# Patient Record
Sex: MALE | Race: WHITE | NOT HISPANIC OR LATINO
[De-identification: names, ages, dates, MRNs, and addresses within clinical notes are randomized per-mention and may not be internally consistent; named-entity substitution may affect disease eponyms.]

---

## 2024-09-26 ENCOUNTER — APPOINTMENT (OUTPATIENT)
Dept: ORTHOPEDIC SURGERY | Facility: CLINIC | Age: 34
End: 2024-09-26
Payer: OTHER MISCELLANEOUS

## 2024-09-26 VITALS — BODY MASS INDEX: 29.8 KG/M2 | WEIGHT: 220 LBS | HEIGHT: 72 IN

## 2024-09-26 DIAGNOSIS — M54.16 RADICULOPATHY, LUMBAR REGION: ICD-10-CM

## 2024-09-26 DIAGNOSIS — Z78.9 OTHER SPECIFIED HEALTH STATUS: ICD-10-CM

## 2024-09-26 PROCEDURE — 99204 OFFICE O/P NEW MOD 45 MIN: CPT

## 2024-09-27 NOTE — HISTORY OF PRESENT ILLNESS
[de-identified] : The patient is a 33 year  old R hand dominant male who presents today complaining of L knee pain.   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 This is NOT an athletic injury occurring associated with an interscholastic or organized sports team. Quality of symptoms: sharp, stabbing lateral pain radiates into shin, dull aching, tight anterior knee pain, clicking, catching with deep knee flexion Improves with: rest, compression sleeve Worse with: prolonged walking, up stairs, squats Prior treatment: [facility] [place] at [facility], [facility]ARE[facility] doc Prior Imaging: Xray at ED, MRI at P Out of work/sport: currently out of work since 8/26/24 School/Sport/Position/Occupation: [employer] Additional Information: None

## 2024-09-27 NOTE — IMAGING
[de-identified] : The patient is a well appearing 33 year old male of their stated age. Patient ambulates with a normal gait. POSITIVE straight leg raise LEFT SIDED   Effected Knee: LEFT ROM:  0-145 degrees Lachman: Negative Pivot Shift: Negative Anterior Drawer: Negative Posterior Drawer / Sag: Negative Varus Stress 0 degrees: Stable Varus Stress 30 degrees: Stable Valgus Stress 0 degrees: Stable Valgus Stress 30 degrees: Stable Medial Tad: Negative Lateral Tad: Negative Patella Glide: 2+ Patella Apprehension: Negative Patella Grind: Negative   Palpation: Medial Joint Line: TENDER  Lateral Joint Line: Nontender Medial Collateral Ligament: Nontender Lateral Collateral Ligament/PLC: Nontender Distal Femur: Nontender Proximal Tibia: Nontender Tibial Tubercle: Nontender Distal Pole Patella: Nontender Quadriceps Tendon: Nontender &  Intact Patella Tendon: Nontender &  Intact Medial Femoral Condyle: Nontender Medial Distal Hamstring/PES: Nontender Lateral Distal Hamstring: Nontender & Stable Iliotibial Band: Nontender Medial Patellofemoral Ligament: Nontender Adductor: Nontender Proximal GSC-Plantaris: Nontender Calf: Supple & Nontender   Inspection: Deformity: No Erythema: No Ecchymosis: No Abrasions: No Effusion: No Prepatella Bursitis: No Neurologic Exam: Sensation L4-S1: Grossly Intact Motor Exam: Quadriceps: 3 out of 5 Hamstrings: 5 out of 5 EHL: 5 out of 5 FHL: 5 out of 5 TA: 5 out of 5 GS: 5 out of 5 Circulatory/Pulses: Dorsalis Pedis: 2+ Posterior Tibialis: 2+ Additional Pertinent Findings: None     Contralateral Knee:                       ROM: 0-145 degrees Other Pertinent Findings: None   Assessment: The patient is a 33-year-old male with Left knee pain and radiographic and physical exam findings consistent with MMT and possible Lumbar HNP.  The patient's condition is acute Documents/Results Reviewed Today: MRI left knee Tests/Studies Independently Interpreted Today: MRI of left knee reveals evidence of medial meniscus tear Pertinent findings include: 0-145, MJLT, - Tad, + SLR on left, 3/5 Quadriceps, 3/5 Hip flexion Confounding medical conditions/concerns: None     Plan: Due to worsening pain and instability with mechanical symptoms, patient will obtain MRI Lumbar spine to evaluate for possible HNP In the interim, we reviewed appropriate use of OTC anti-inflammatories as needed for pain, inflammation, and discomfort. Modify activity as discussed. Tests Ordered:  MRI lumbar spine  Prescription Medications Ordered: Discussed appropriate use of OTC anti-inflammatories and analgesics (including but not limited to Aleve, Advil, Tylenol, Motrin, Ibuprofen, Voltaren gel, etc.) Braces/DME Ordered: None Activity/Work/Sports Status: Currently OOW Additional Instructions: None  Follow-Up: s/p MRI  The patient's current medication management of their orthopedic diagnosis was reviewed today: (1) We discussed a comprehensive treatment plan that included possible pharmaceutical management involving the use of prescription strength medications including but not limited to options such as oral Naprosyn 500mg BID, once daily Meloxicam 15 mg, or 500-650 mg Tylenol versus over the counter oral medications and topical prescription NSAID Pennsaid vs over the counter Voltaren gel.  Based on our extensive discussion, the patient declined prescription medication and will use over the counter Advil, Alleve, Voltaren Gel or Tylenol as directed. (2) There is a moderate risk of morbidity with further treatment, especially from use of prescription strength medications and possible side effects of these medications which include upset stomach with oral medications, skin reactions to topical medications and cardiac/renal issues with long term use. (3) I recommended that the patient follow-up with their medical physician to discuss any significant specific potential issues with long term medication use such as interactions with current medications or with exacerbation of underlying medical comorbidities. (4) The benefits and risks associated with use of injectable, oral or topical, prescription and over the counter anti-inflammatory medications were discussed with the patient. The patient voiced understanding of the risks including but not limited to bleeding, stroke, kidney dysfunction, heart disease, and were referred to the black box warning label for further information.  Cony RUANO attest that this documentation has been prepared under the direction and in the presence of Provider Dr. Jose Mccarthy.  The documentation recorded by the scribe accurately reflects the services IDr. Jose, personally performed and the decisions made by me.

## 2024-09-27 NOTE — DATA REVIEWED
[MRI] : MRI [Left] : left [Knee] : knee [Report was reviewed and noted in the chart] : The report was reviewed and noted in the chart [I independently reviewed and interpreted images and report] : I independently reviewed and interpreted images and report [I reviewed the films/CD and additionally noted] : I reviewed the films/CD and additionally noted [FreeTextEntry1] : MRI of left knee reveals evidence of medial meniscus tear

## 2024-09-27 NOTE — HISTORY OF PRESENT ILLNESS
[de-identified] : The patient is a 33 year  old R hand dominant male who presents today complaining of L knee pain.   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 This is NOT an athletic injury occurring associated with an interscholastic or organized sports team. Quality of symptoms: sharp, stabbing lateral pain radiates into shin, dull aching, tight anterior knee pain, clicking, catching with deep knee flexion Improves with: rest, compression sleeve Worse with: prolonged walking, up stairs, squats [facility] [place] [facility], [facility]ARE[facility] doc Prior Imaging: Xray at ED, MRI at P Out of work/sport: currently out of work since 8/26/24 School/Sport/Position/Occupation: [employer] Additional Information: None

## 2024-09-27 NOTE — IMAGING
[de-identified] : The patient is a well appearing 33 year old male of their stated age. Patient ambulates with a normal gait. POSITIVE straight leg raise LEFT SIDED   Effected Knee: LEFT ROM:  0-145 degrees Lachman: Negative Pivot Shift: Negative Anterior Drawer: Negative Posterior Drawer / Sag: Negative Varus Stress 0 degrees: Stable Varus Stress 30 degrees: Stable Valgus Stress 0 degrees: Stable Valgus Stress 30 degrees: Stable Medial Tad: Negative Lateral Tad: Negative Patella Glide: 2+ Patella Apprehension: Negative Patella Grind: Negative   Palpation: Medial Joint Line: TENDER  Lateral Joint Line: Nontender Medial Collateral Ligament: Nontender Lateral Collateral Ligament/PLC: Nontender Distal Femur: Nontender Proximal Tibia: Nontender Tibial Tubercle: Nontender Distal Pole Patella: Nontender Quadriceps Tendon: Nontender &  Intact Patella Tendon: Nontender &  Intact Medial Femoral Condyle: Nontender Medial Distal Hamstring/PES: Nontender Lateral Distal Hamstring: Nontender & Stable Iliotibial Band: Nontender Medial Patellofemoral Ligament: Nontender Adductor: Nontender Proximal GSC-Plantaris: Nontender Calf: Supple & Nontender   Inspection: Deformity: No Erythema: No Ecchymosis: No Abrasions: No Effusion: No Prepatella Bursitis: No Neurologic Exam: Sensation L4-S1: Grossly Intact Motor Exam: Quadriceps: 3 out of 5 Hamstrings: 5 out of 5 EHL: 5 out of 5 FHL: 5 out of 5 TA: 5 out of 5 GS: 5 out of 5 Circulatory/Pulses: Dorsalis Pedis: 2+ Posterior Tibialis: 2+ Additional Pertinent Findings: None     Contralateral Knee:                       ROM: 0-145 degrees Other Pertinent Findings: None   Assessment: The patient is a 33-year-old male with Left knee pain and radiographic and physical exam findings consistent with MMT and possible Lumbar HNP.  The patient's condition is acute Documents/Results Reviewed Today: MRI left knee Tests/Studies Independently Interpreted Today: MRI of left knee reveals evidence of medial meniscus tear Pertinent findings include: 0-145, MJLT, - Tad, + SLR on left, 3/5 Quadriceps, 3/5 Hip flexion Confounding medical conditions/concerns: None     Plan: Due to worsening pain and instability with mechanical symptoms, patient will obtain MRI Lumbar spine to evaluate for possible HNP In the interim, we reviewed appropriate use of OTC anti-inflammatories as needed for pain, inflammation, and discomfort. Modify activity as discussed. Tests Ordered:  MRI lumbar spine  Prescription Medications Ordered: Discussed appropriate use of OTC anti-inflammatories and analgesics (including but not limited to Aleve, Advil, Tylenol, Motrin, Ibuprofen, Voltaren gel, etc.) Braces/DME Ordered: None Activity/Work/Sports Status: Currently OOW Additional Instructions: None  Follow-Up: s/p MRI  The patient's current medication management of their orthopedic diagnosis was reviewed today: (1) We discussed a comprehensive treatment plan that included possible pharmaceutical management involving the use of prescription strength medications including but not limited to options such as oral Naprosyn 500mg BID, once daily Meloxicam 15 mg, or 500-650 mg Tylenol versus over the counter oral medications and topical prescription NSAID Pennsaid vs over the counter Voltaren gel.  Based on our extensive discussion, the patient declined prescription medication and will use over the counter Advil, Alleve, Voltaren Gel or Tylenol as directed. (2) There is a moderate risk of morbidity with further treatment, especially from use of prescription strength medications and possible side effects of these medications which include upset stomach with oral medications, skin reactions to topical medications and cardiac/renal issues with long term use. (3) I recommended that the patient follow-up with their medical physician to discuss any significant specific potential issues with long term medication use such as interactions with current medications or with exacerbation of underlying medical comorbidities. (4) The benefits and risks associated with use of injectable, oral or topical, prescription and over the counter anti-inflammatory medications were discussed with the patient. The patient voiced understanding of the risks including but not limited to bleeding, stroke, kidney dysfunction, heart disease, and were referred to the black box warning label for further information.  Cony RUANO attest that this documentation has been prepared under the direction and in the presence of Provider Dr. Jose Mccarthy.  The documentation recorded by the scribe accurately reflects the services IDr. Jose, personally performed and the decisions made by me.